# Patient Record
Sex: FEMALE | Race: WHITE
[De-identification: names, ages, dates, MRNs, and addresses within clinical notes are randomized per-mention and may not be internally consistent; named-entity substitution may affect disease eponyms.]

---

## 2018-03-29 NOTE — HP
Past Medical History





- Admission


Chief Complaint: Postmenopausal Bleeding.


History of Present Illness: 





58 year old  LMP 2016 who had sudden onset of bright red heavy 

bleeding with cramping on 2018. Due to persistence of bleeding was 

seen at Urgent care where evaluation revealed a uterus with endometrium of 11 

mm. She was seen on  with normal gyn exam except for the presence of 

dark blood in the vagina. Flow had subsided. 


Pap smear 17 negative for malignancy.


Pelvic ultrasound performed 16 for episode of irregular bleeding revealed 

uterus 8 by 4 by 4 cm. 2.7 cm fibroid left lateral wall of uterus with 

endometrium normal measuring 5.6 cm.


History Source: Patient


Limitations to Obtaining History: No Limitations





- Past Medical History


...: 3


...Para: 2


...Spon : 1





- Past Surgical History


Hx Myomectomy: No


Hx Transabdominal Cerclage: No





- Smoking History


Smoking history: Former smoker


Have you smoked in the past 12 months: No


If you are a former smoker, when did you quit?: YRS AGO





- Alcohol/Substance Use


Hx Alcohol Use: Yes (SOCIALLY)





- Social History


Usual Living Arrangement: Yes: With Spouse





Home Medications





- Allergies


Allergies/Adverse Reactions: 


 Allergies











Allergy/AdvReac Type Severity Reaction Status Date / Time


 


No Known Drug Allergies Allergy   Verified 18 09:28














- Home Medications


Home Medications: 


Ambulatory Orders





Levothyroxine [Synthroid -] 25 mcg PO DAILY 18 











Family Disease History





- Family Disease History


Family Disease History: Heart Disease: Father


Other Family History: Maternal aunt Breast Cancer.





Review of Systems





- Review of Systems


Constitutional: reports: No Symptoms


Neck: reports: No Symptoms


Cardiovascular: reports: No Symptoms


Respiratory: reports: No Symptoms


Gastrointestinal: reports: No Symptoms


Genitourinary: reports: No Symptoms


Musculoskeletal: reports: No Symptoms


Neurological: reports: No Symptoms


Psychiatric: reports: No Symptoms





Physical Exam-GYN


Constitutional: Yes: Well Nourished, No Distress, Calm


Eyes: Yes: WNL


Neck: Yes: WNL, Supple, Trachea Midline


Cardiovascular: Yes: WNL, Regular Rate and Rhythm


Respiratory: Yes: WNL, Regular, CTA Bilaterally


Gastrointestinal: Yes: WNL, Soft


Pelvis: Yes: WNL


External Genitalia: Yes: Normal


Internal Exam Deferred: No


Vaginal Exam: Yes: Normal, Bleeding


Cervix: Yes: Normal


Uterus: Yes: Normal, Freely Moveable, Firm


Adnexa: Normal: Bilateral


Edema: No


Neurological: Yes: WNL





Imaging





- Results


Ultrasound: Report Reviewed (Uterus with endometrium 11 mm.)





Assessment/Plan





Postmenopausal bleeding





Plan:





D and C with Hysteroscopy

## 2018-03-30 ENCOUNTER — HOSPITAL ENCOUNTER (OUTPATIENT)
Dept: HOSPITAL 74 - JASU-SURG | Age: 58
Discharge: HOME | End: 2018-03-30
Attending: OBSTETRICS & GYNECOLOGY
Payer: COMMERCIAL

## 2018-03-30 VITALS — TEMPERATURE: 98 F

## 2018-03-30 VITALS — SYSTOLIC BLOOD PRESSURE: 125 MMHG | DIASTOLIC BLOOD PRESSURE: 69 MMHG | HEART RATE: 67 BPM

## 2018-03-30 VITALS — BODY MASS INDEX: 25.7 KG/M2

## 2018-03-30 DIAGNOSIS — N88.2: ICD-10-CM

## 2018-03-30 DIAGNOSIS — N95.0: Primary | ICD-10-CM

## 2018-03-30 PROCEDURE — 0UDB8ZX EXTRACTION OF ENDOMETRIUM, VIA NATURAL OR ARTIFICIAL OPENING ENDOSCOPIC, DIAGNOSTIC: ICD-10-PCS | Performed by: OBSTETRICS & GYNECOLOGY

## 2018-03-30 NOTE — OP
DATE OF OPERATION:  03/30/2018

 

PREOPERATIVE DIAGNOSIS:  Postmenopausal bleeding.

 

POSTOPERATIVE DIAGNOSIS:

1.  Postmenopausal bleeding.

2.  Cervical stenosis.

 

SURGEON:  Jermaine Berry MD

 

ANESTHESIA:  General, Dr. Frost

 

BLOOD LOSS:  10 mL.

 

DRAINAGE:  None.

 

SPECIMEN:  Endometrial curetting.

 

DESCRIPTION OF PROCEDURE:    Under general anesthesia, patient was placed in the

dorsal lithotomy position, prepped and draped in usual sterile manner.  A pelvic

examination was performed revealing a uterus that was anterior, normal size and

shape.  Adnexa were negative bilaterally.  A weighted speculum was inserted in the

vagina.  The anterior lip of the cervix was grasped with a sharp tenaculum.  The

uterus was unable to be sounded due to marked cervical stenosis.  With a

hysteroscope, the cervical opening was visualized.  There was a small opening noted

in the right corner of the cervical os.  With use of a lacrimal dilator, this opening

was then opened to allow a small dilator to be introduced.  The hysteroscope was then

inserted.  The uterine cavity was noted to be intact.  The uterine cavity was

visualized in entirety.  There was no evidence of any polyps or fibroids detected. 

With these findings noted, the hysteroscope was then removed.  The cervical canal was

then dilated fully.  The uterine cavity was then curetted.  Endometrial-type tissue

was obtained.  Patient tolerated the procedure well, and brought to recovery room in

satisfactory condition. 

 

 

JERMAINE BERRY M.D.

 

[id number]

DD: 03/30/2018 13:14

DT: 03/30/2018 13:55

Job #:  [id number]

## 2018-03-30 NOTE — OP
Operative Note





- Note:


Operative Date: 03/30/18


Pre-Operative Diagnosis: Postmenopausal bleeding


Operation: D and C with Hysteroscopy


Post-Operative Diagnosis: Other (Cervical stenosis)


Anesthesiologist/CRNA: Betito Frost


Anesthesia: General


Specimens Removed: Endometrial curettings


Operative Report Dictated: Yes

## 2018-04-02 NOTE — PATH
Surgical Pathology Report



Patient Name:  MINESH TRIANA

Accession #:  [id number]

Med. Rec. #:  [id number]                                                        

   /Age/Gender:  1960 (Age: 58) / F

Account:  [id number]                                                          

             Location: [facility]

Taken:  3/30/2018

Received:  3/30/2018

Reported:  2018

Physicians:  Jermaine Dodson M.D.

  



Specimen(s) Received

 ENDOMETRIAL CURETTINGS 





Clinical History

Post menopausal bleeding







Final Diagnosis

ENDOMETRIUM, CURETTING:

ENDOMETRIUM WITH STROMAL AND GLANDULAR BREAKDOWN.

BENIGN ENDOCERVICAL TISSUE PRESENT.

MYOMETRIAL TISSUE IS PRESENT.

NO ENDOMETRIAL HYPERPLASIA OR CARCINOMA IDENTIFIED.



Comment:  Recommend correlation with clinical findings and follow up as

clinically indicated.





***Electronically Signed***

Juanjose Lynn M.D.





Gross Description

Received in formalin labeled "endometrial curettings" are multiple fragments of

red-tan hemorrhagic soft tissue measuring 2 x 2 x 0.7 cm in aggregate. Entire

specimen submitted in one cassette.

HAYDEN/3/30/2018



mitul/3/30/2018